# Patient Record
Sex: FEMALE
[De-identification: names, ages, dates, MRNs, and addresses within clinical notes are randomized per-mention and may not be internally consistent; named-entity substitution may affect disease eponyms.]

---

## 2024-06-18 PROBLEM — Z00.00 ENCOUNTER FOR PREVENTIVE HEALTH EXAMINATION: Status: ACTIVE | Noted: 2024-06-18

## 2024-06-24 ENCOUNTER — APPOINTMENT (OUTPATIENT)
Dept: SURGERY | Facility: CLINIC | Age: 54
End: 2024-06-24
Payer: MEDICAID

## 2024-06-24 VITALS
BODY MASS INDEX: 30 KG/M2 | OXYGEN SATURATION: 98 % | HEART RATE: 65 BPM | HEIGHT: 62 IN | WEIGHT: 163 LBS | SYSTOLIC BLOOD PRESSURE: 96 MMHG | DIASTOLIC BLOOD PRESSURE: 68 MMHG

## 2024-06-24 DIAGNOSIS — Z72.0 TOBACCO USE: ICD-10-CM

## 2024-06-24 DIAGNOSIS — K82.8 OTHER SPECIFIED DISEASES OF GALLBLADDER: ICD-10-CM

## 2024-06-24 DIAGNOSIS — K80.20 CALCULUS OF GALLBLADDER W/OUT CHOLECYSTITIS W/OUT OBSTRUCTION: ICD-10-CM

## 2024-06-24 PROCEDURE — 99204 OFFICE O/P NEW MOD 45 MIN: CPT

## 2024-06-24 RX ORDER — LOSARTAN POTASSIUM 100 MG/1
TABLET, FILM COATED ORAL
Refills: 0 | Status: ACTIVE | COMMUNITY

## 2024-06-24 RX ORDER — METOPROLOL TARTRATE 75 MG/1
TABLET, FILM COATED ORAL
Refills: 0 | Status: ACTIVE | COMMUNITY

## 2024-06-24 NOTE — CONSULT LETTER
[Dear  ___] : Dear  [unfilled], [Consult Letter:] : I had the pleasure of evaluating your patient, [unfilled]. [Please see my note below.] : Please see my note below. [Consult Closing:] : Thank you very much for allowing me to participate in the care of this patient.  If you have any questions, please do not hesitate to contact me. [Sincerely,] : Sincerely, [FreeTextEntry3] : Corey Herring MD, FACS

## 2024-06-24 NOTE — PHYSICAL EXAM
[Abdominal Masses] : No abdominal masses [Abdomen Tenderness] : ~T ~M No abdominal tenderness [de-identified] : She  is alert, well-groomed, and in NAD   [de-identified] : anicteric.  Nasal mucosa pink, septum midline. Oral mucosa pink.  Tongue midline, Pharynx without exudates.   [de-identified] : Neck supple. Trachea midline. Thyroid isthmus barely palpable, lobes not felt.

## 2024-06-24 NOTE — PLAN
[FreeTextEntry1] : Ms. MAHNAZ SUTTON  was told significance of findings, options, risks and benefits were explained.  Informed consent for laparoscopic/possible open  cholecystectomy  and potential risks, benefits and alternatives (surgical options were discussed including non-surgical options or the option of no surgery) to the planned surgery were discussed in depth.  All surgical options were discussed including non-surgical treatments.  She wishes to proceed with surgery.  We will plan for surgery on at the next available date, pending any required insurance pre-certification or pre-approval. She agrees to obtain any necessary pre-operative evaluations and testing prior to surgery. Patient instructed to maintain a fat-free diet, and to seek immediate medical attention with any acute change or worsening of symptoms, including but not limited to abdominal pain, fever, chills, nausea, vomiting, or yellowing of the skin.  Patient advised to seek immediate medical attention with any acute change in symptoms or with the development of any new or worsening symptoms.  Patient's questions and concerns addressed to patient's satisfaction, and patient verbalized an understanding of the information discussed.

## 2024-06-24 NOTE — DATA REVIEWED
[FreeTextEntry1] : 	 Exam requested by: NATASHA ABRAMS MD [id number], [place] [facility] [place] [address] SITE PERFORMED: [place] [facility] PHONE: [phone] Patient: MYKEL ALCANTARA YOB: 1970 Phone: [phone] MRN: [id number] Acc: [id number] Date of Exam: 06-   EXAM:  ULTRASOUND ABDOMEN COMPLETE  HISTORY:  Female, 54 years-old with functional dyspepsia  TECHNIQUE:  Using real-time ultrasonography with a high-resolution broadband phased-array curved transducer, multiplanar gray scale images were obtained and supplemented with color Doppler. Static images are provided for review.  COMPARISON:  None  FINDINGS:   Abdominal Aorta/IVC: No evidence of abdominal aortic aneurysm. Visualized portions of the IVC were patent.  Liver:  Normal in size, morphology and contour. The visualized portion of portal and hepatic veins are unremarkable. No intrahepatic biliary duct dilatation. The liver has moderately hyperechoic echotexture.  Gallbladder: The gallbladder is impacted with sludge and small but numerous shadowing gallstones. Acoustic shadowing generated by the gallstones limits sonographically imaging of the distal or posterior gallbladder wall. The remainder of the wall reveals no thickening.  Common Duct: Normal measuring 0.4 cm diameter at the asim hepatis.  Pancreas: The pancreas is obscured by overlying bowel and acoustic shadowing.  No ascites.  Kidneys: Normal in size, morphology and cortical echotexture. There is no suspicious renal lesion. No hydronephrosis, shadowing calculi or perinephric fluid. Right Kidney: 11.3 cm in length. Left Kidney:   11.2 cm in length.  Spleen: Normal size, contour and echotexture measuring 8.3 cm in length.  IMPRESSION: 1. The gallbladder is impacted with sludge and small but numerous shadowing gallstones. Acoustic shadowing generated by the gallstones limits sonographically imaging of the distal or posterior gallbladder wall. The remainder of the wall reveals no thickening. No apparent bile duct ectasia. If however there is any clinical concern for cholecystitis and/or gallbladder functional abnormality, consider HIDA nuclear scan. 2. The liver is moderately hyperechoic, compatible with steatosis, without hepatomegaly. 3. The pancreas is obscured by overlying bowel and acoustic shadowing.     Thank you for the opportunity to participate in the care of this patient.     KANE ABREU MD  - Electronically Signed: 06- 7:31 PM  Physician to Physician Direct Line is: [phone]

## 2024-06-24 NOTE — HISTORY OF PRESENT ILLNESS
[de-identified] : Ms. MYKEL SUTTON is a 54 year  old patient who was referred by Dr. Ivan Quach  with the chief complaint of having right upper quadrant and epigastric pain on and off  for  along time ( ~20 years).  Pain is non-radiating.    She reports no nausea or vomiting and no history of jaundice, acholia or acholuria.   Appetite is good and weight is stable.   She   has  family history of biliary tract disease in her son. She had an abdominal sonogram on  06/01/2024 which revealed GB stone and sludge . CBD is normal

## 2024-07-17 ENCOUNTER — OUTPATIENT (OUTPATIENT)
Dept: OUTPATIENT SERVICES | Facility: HOSPITAL | Age: 54
LOS: 1 days | End: 2024-07-17
Payer: COMMERCIAL

## 2024-07-17 VITALS
WEIGHT: 166.01 LBS | DIASTOLIC BLOOD PRESSURE: 58 MMHG | RESPIRATION RATE: 189 BRPM | SYSTOLIC BLOOD PRESSURE: 96 MMHG | OXYGEN SATURATION: 97 % | HEIGHT: 62.5 IN | HEART RATE: 57 BPM | TEMPERATURE: 97 F

## 2024-07-17 DIAGNOSIS — Z01.818 ENCOUNTER FOR OTHER PREPROCEDURAL EXAMINATION: ICD-10-CM

## 2024-07-17 DIAGNOSIS — Z98.891 HISTORY OF UTERINE SCAR FROM PREVIOUS SURGERY: Chronic | ICD-10-CM

## 2024-07-17 DIAGNOSIS — K81.9 CHOLECYSTITIS, UNSPECIFIED: ICD-10-CM

## 2024-07-17 DIAGNOSIS — I10 ESSENTIAL (PRIMARY) HYPERTENSION: ICD-10-CM

## 2024-07-17 DIAGNOSIS — Z98.51 TUBAL LIGATION STATUS: Chronic | ICD-10-CM

## 2024-07-17 LAB — BLD GP AB SCN SERPL QL: SIGNIFICANT CHANGE UP

## 2024-07-17 RX ORDER — METOPROLOL TARTRATE 50 MG
1 TABLET ORAL
Refills: 0 | DISCHARGE

## 2024-07-17 RX ORDER — LOSARTAN POTASSIUM 100 MG/1
1 TABLET, FILM COATED ORAL
Refills: 0 | DISCHARGE

## 2024-07-17 NOTE — H&P PST ADULT - ASSESSMENT
54 years old female with PMH of HTN presents to [facility] for presurgical evaluation. Patient diagnosed with cholecystitis and is now scheduled for laparoscopic cholecystectomy with intra-op cholangiogram possible open on 7/19/24. Patient optimized for by her PCP.

## 2024-07-17 NOTE — H&P PST ADULT - PROBLEM SELECTOR PLAN 1
Patient scheduled for laparoscopic cholecystectomy with intra-op cholangiogram possible open on 7/19/24. Preop instructions given both verbal and written,  instructed to be NPO the night before and the morning of surgery. Provided with chlorhexidine 4% solution to wash for 3 days including the morning of surgery, bottle provided .Instructed to avoid aspirin and over the counter medications including vitamins and herbal medications one week before surgery. Patient verbalized understanding. Escort required  Stop bang score is 2  , patient low risk for DOC.  T&S collected here today.  Patient optimized for by her PCP.

## 2024-07-17 NOTE — H&P PST ADULT - NSICDXFAMILYHX_GEN_ALL_CORE_FT
FAMILY HISTORY:  Father  Still living? Yes, Estimated age: Age Unknown  FH: HTN (hypertension), Age at diagnosis: Age Unknown    Mother  Still living? Unknown  FH: HTN (hypertension), Age at diagnosis: Age Unknown

## 2024-07-17 NOTE — H&P PST ADULT - HISTORY OF PRESENT ILLNESS
54 years old female with PMH 54 years old female with PMH of HTN presents to [facility] for presurgical evaluation. Patient diagnosed with cholecystitis and is now scheduled for laparoscopic cholecystectomy with intra-op cholangiogram possible open on 7/19/24. Patient optimized for by her PCP.

## 2024-07-17 NOTE — H&P PST ADULT - PROBLEM SELECTOR PLAN 2
Patient instructed to continue antihypertensive medications as prescribed and take the morning of surgery with just a sips of water.

## 2024-07-18 PROCEDURE — 86900 BLOOD TYPING SEROLOGIC ABO: CPT

## 2024-07-18 PROCEDURE — 86850 RBC ANTIBODY SCREEN: CPT

## 2024-07-18 PROCEDURE — T1013: CPT

## 2024-07-18 PROCEDURE — 36415 COLL VENOUS BLD VENIPUNCTURE: CPT

## 2024-07-18 PROCEDURE — G0463: CPT

## 2024-07-18 PROCEDURE — 86901 BLOOD TYPING SEROLOGIC RH(D): CPT

## 2024-08-06 PROBLEM — I10 ESSENTIAL (PRIMARY) HYPERTENSION: Chronic | Status: ACTIVE | Noted: 2024-07-17

## 2024-08-07 ENCOUNTER — APPOINTMENT (OUTPATIENT)
Dept: SURGERY | Facility: HOSPITAL | Age: 54
End: 2024-08-07

## 2024-08-16 ENCOUNTER — APPOINTMENT (OUTPATIENT)
Dept: SURGERY | Facility: HOSPITAL | Age: 54
End: 2024-08-16

## 2024-08-16 ENCOUNTER — RESULT REVIEW (OUTPATIENT)
Age: 54
End: 2024-08-16

## 2024-08-16 ENCOUNTER — TRANSCRIPTION ENCOUNTER (OUTPATIENT)
Age: 54
End: 2024-08-16

## 2024-08-21 ENCOUNTER — APPOINTMENT (OUTPATIENT)
Dept: GASTROENTEROLOGY | Facility: CLINIC | Age: 54
End: 2024-08-21
Payer: COMMERCIAL

## 2024-08-21 DIAGNOSIS — K80.50 CALCULUS OF BILE DUCT W/OUT CHOLANGITIS OR CHOLECYSTITIS W/OUT OBSTRUCTION: ICD-10-CM

## 2024-08-21 PROCEDURE — 99203 OFFICE O/P NEW LOW 30 MIN: CPT

## 2024-08-21 NOTE — ASSESSMENT
[FreeTextEntry1] : 54F with pmhx of HTN, cholelithiasis s/p cholecystectomy presenting for evaluation for choledocholithiasis. Had recent cholecystectomy, IOC showed distal cbd stone. Denies any complaints currently. - IOC reviewed, schedule ERCP for stone removal asap.  Total time spent to complete patient's assessment, review medical chart including labs & personal review of prior imaging and available endoscopy records, counseling and discussion of plan of care was more than 30 minutes.

## 2024-08-21 NOTE — HISTORY OF PRESENT ILLNESS
[FreeTextEntry1] : 54F with pmhx of HTN, cholelithiasis s/p cholecystectomy presenting for evaluation for choledocholithiasis. Had recent cholecystectomy, IOC showed distal cbd stone. Denies any complaints currently, no abd pain, n/v/d/c, melena, hematochezia, fever/chills, jaundice, dark urine, or other issues. Feels well.

## 2024-09-05 ENCOUNTER — APPOINTMENT (OUTPATIENT)
Dept: GASTROENTEROLOGY | Facility: HOSPITAL | Age: 54
End: 2024-09-05

## 2024-09-05 ENCOUNTER — OUTPATIENT (OUTPATIENT)
Dept: OUTPATIENT SERVICES | Facility: HOSPITAL | Age: 54
LOS: 1 days | Discharge: HOME | End: 2024-09-05
Payer: MEDICAID

## 2024-09-05 ENCOUNTER — TRANSCRIPTION ENCOUNTER (OUTPATIENT)
Age: 54
End: 2024-09-05

## 2024-09-05 ENCOUNTER — RESULT REVIEW (OUTPATIENT)
Age: 54
End: 2024-09-05

## 2024-09-05 VITALS
WEIGHT: 164.91 LBS | DIASTOLIC BLOOD PRESSURE: 68 MMHG | SYSTOLIC BLOOD PRESSURE: 117 MMHG | HEART RATE: 52 BPM | HEIGHT: 62 IN | RESPIRATION RATE: 12 BRPM | TEMPERATURE: 97 F | OXYGEN SATURATION: 100 %

## 2024-09-05 VITALS
RESPIRATION RATE: 16 BRPM | OXYGEN SATURATION: 100 % | DIASTOLIC BLOOD PRESSURE: 62 MMHG | HEART RATE: 55 BPM | SYSTOLIC BLOOD PRESSURE: 145 MMHG

## 2024-09-05 DIAGNOSIS — Z98.51 TUBAL LIGATION STATUS: Chronic | ICD-10-CM

## 2024-09-05 DIAGNOSIS — K80.50 CALCULUS OF BILE DUCT WITHOUT CHOLANGITIS OR CHOLECYSTITIS WITHOUT OBSTRUCTION: ICD-10-CM

## 2024-09-05 DIAGNOSIS — Z98.891 HISTORY OF UTERINE SCAR FROM PREVIOUS SURGERY: Chronic | ICD-10-CM

## 2024-09-05 PROCEDURE — 43239 EGD BIOPSY SINGLE/MULTIPLE: CPT

## 2024-09-05 PROCEDURE — 43274 ERCP DUCT STENT PLACEMENT: CPT

## 2024-09-05 PROCEDURE — 43264 ERCP REMOVE DUCT CALCULI: CPT

## 2024-09-05 PROCEDURE — 88305 TISSUE EXAM BY PATHOLOGIST: CPT | Mod: 26

## 2024-09-05 DEVICE — CATH BLLN EXTRACT DIST GUIDE WIRE 15MM 3LUM: Type: IMPLANTABLE DEVICE | Status: FUNCTIONAL

## 2024-09-05 DEVICE — CLIP HEMOSTASIS DURACLIP 11MM: Type: IMPLANTABLE DEVICE | Status: FUNCTIONAL

## 2024-09-05 DEVICE — HYDRATOME 44: Type: IMPLANTABLE DEVICE | Status: FUNCTIONAL

## 2024-09-05 DEVICE — STENT BIL ADVANIX 10FRX5CM PRELOADED: Type: IMPLANTABLE DEVICE | Status: FUNCTIONAL

## 2024-09-05 RX ORDER — ACETAMINOPHEN 325 MG/1
1000 TABLET ORAL ONCE
Refills: 0 | Status: COMPLETED | OUTPATIENT
Start: 2024-09-05 | End: 2024-09-05

## 2024-09-05 RX ORDER — INDOMETHACIN 75 MG
100 CAPSULE, EXTENDED RELEASE ORAL ONCE
Refills: 0 | Status: ACTIVE | OUTPATIENT
Start: 2024-09-05 | End: 2024-09-05

## 2024-09-05 RX ADMIN — Medication 30 MILLILITER(S): at 08:26

## 2024-09-05 RX ADMIN — ACETAMINOPHEN 400 MILLIGRAM(S): 325 TABLET ORAL at 10:32

## 2024-09-05 NOTE — ASU DISCHARGE PLAN (ADULT/PEDIATRIC) - NS MD DC FALL RISK RISK
For information on Fall & Injury Prevention, visit: [email or web address] OR  [email or web address] OR  https://www.cdc.gov/steadi/patient.html

## 2024-09-09 LAB — SURGICAL PATHOLOGY STUDY: SIGNIFICANT CHANGE UP

## 2024-10-30 ENCOUNTER — APPOINTMENT (OUTPATIENT)
Dept: GASTROENTEROLOGY | Facility: CLINIC | Age: 54
End: 2024-10-30
Payer: COMMERCIAL

## 2024-10-30 DIAGNOSIS — A04.8 OTHER SPECIFIED BACTERIAL INTESTINAL INFECTIONS: ICD-10-CM

## 2024-10-30 DIAGNOSIS — K80.50 CALCULUS OF BILE DUCT W/OUT CHOLANGITIS OR CHOLECYSTITIS W/OUT OBSTRUCTION: ICD-10-CM

## 2024-10-30 PROCEDURE — 99443: CPT

## 2024-10-30 RX ORDER — METRONIDAZOLE 500 MG/1
500 TABLET ORAL 3 TIMES DAILY
Qty: 42 | Refills: 0 | Status: ACTIVE | COMMUNITY
Start: 2024-10-30 | End: 1900-01-01

## 2024-10-30 RX ORDER — OMEPRAZOLE 40 MG/1
40 CAPSULE, DELAYED RELEASE ORAL TWICE DAILY
Qty: 28 | Refills: 0 | Status: ACTIVE | COMMUNITY
Start: 2024-10-30 | End: 1900-01-01

## 2024-10-30 RX ORDER — DOXYCYCLINE HYCLATE 100 MG/1
100 TABLET ORAL
Qty: 28 | Refills: 0 | Status: ACTIVE | COMMUNITY
Start: 2024-10-30 | End: 1900-01-01

## 2024-11-21 NOTE — ASU PATIENT PROFILE, ADULT - NSICDXPASTSURGICALHX_GEN_ALL_CORE_FT
Patient brought in by EMS c/o being out of medication. She states that the medication is for her shortness of breath.  Per Medic patient is currently at 99% on room air and oxygen was only applied per patient request. PAST SURGICAL HISTORY:  H/O tubal ligation     H/O:  section

## 2024-11-22 ENCOUNTER — RESULT REVIEW (OUTPATIENT)
Age: 54
End: 2024-11-22

## 2024-11-22 ENCOUNTER — APPOINTMENT (OUTPATIENT)
Dept: GASTROENTEROLOGY | Facility: HOSPITAL | Age: 54
End: 2024-11-22

## 2024-11-22 ENCOUNTER — OUTPATIENT (OUTPATIENT)
Dept: OUTPATIENT SERVICES | Facility: HOSPITAL | Age: 54
LOS: 1 days | Discharge: HOME | End: 2024-11-22
Payer: MEDICAID

## 2024-11-22 ENCOUNTER — TRANSCRIPTION ENCOUNTER (OUTPATIENT)
Age: 54
End: 2024-11-22

## 2024-11-22 VITALS
OXYGEN SATURATION: 98 % | RESPIRATION RATE: 14 BRPM | SYSTOLIC BLOOD PRESSURE: 158 MMHG | HEART RATE: 50 BPM | DIASTOLIC BLOOD PRESSURE: 90 MMHG

## 2024-11-22 VITALS
SYSTOLIC BLOOD PRESSURE: 109 MMHG | TEMPERATURE: 98 F | DIASTOLIC BLOOD PRESSURE: 69 MMHG | OXYGEN SATURATION: 100 % | HEART RATE: 59 BPM | RESPIRATION RATE: 17 BRPM

## 2024-11-22 DIAGNOSIS — Z98.51 TUBAL LIGATION STATUS: Chronic | ICD-10-CM

## 2024-11-22 DIAGNOSIS — A04.8 OTHER SPECIFIED BACTERIAL INTESTINAL INFECTIONS: ICD-10-CM

## 2024-11-22 DIAGNOSIS — Z98.891 HISTORY OF UTERINE SCAR FROM PREVIOUS SURGERY: Chronic | ICD-10-CM

## 2024-11-22 PROCEDURE — 43264 ERCP REMOVE DUCT CALCULI: CPT

## 2024-11-22 PROCEDURE — 43275 ERCP REMOVE FORGN BODY DUCT: CPT

## 2024-11-22 PROCEDURE — 43239 EGD BIOPSY SINGLE/MULTIPLE: CPT

## 2024-11-22 PROCEDURE — 88305 TISSUE EXAM BY PATHOLOGIST: CPT | Mod: 26

## 2024-11-22 DEVICE — HYDRATOME 44: Type: IMPLANTABLE DEVICE | Status: FUNCTIONAL

## 2024-11-22 DEVICE — CATH BLLN EXTRACT DIST GUIDE WIRE 15MM 3LUM: Type: IMPLANTABLE DEVICE | Status: FUNCTIONAL

## 2024-11-22 RX ORDER — ONDANSETRON HYDROCHLORIDE 4 MG/1
4 TABLET, FILM COATED ORAL ONCE
Refills: 0 | Status: COMPLETED | OUTPATIENT
Start: 2024-11-22 | End: 2024-11-22

## 2024-11-22 RX ORDER — ACETAMINOPHEN 500MG 500 MG/1
1000 TABLET, COATED ORAL ONCE
Refills: 0 | Status: COMPLETED | OUTPATIENT
Start: 2024-11-22 | End: 2024-11-22

## 2024-11-22 RX ADMIN — ACETAMINOPHEN 500MG 400 MILLIGRAM(S): 500 TABLET, COATED ORAL at 14:27

## 2024-11-22 RX ADMIN — ONDANSETRON HYDROCHLORIDE 4 MILLIGRAM(S): 4 TABLET, FILM COATED ORAL at 14:27

## 2024-11-22 NOTE — PRE PROCEDURE NOTE - PRE PROCEDURE EVALUATION
HPI:    Here for ERCP.    PAST MEDICAL & SURGICAL HISTORY:  HTN (hypertension)      H/O:  section      H/O tubal ligation        See chart.    MEDICATIONS:    See chart.     ALLERGIES:  No Known Allergies    See chart.     SOCIAL HISTORY:     Denies toxic habits.     FAMILY HISTORY:  FH: HTN (hypertension) (Father, Mother)      Noncontributory.     REVIEW OF SYSTEMS:  CONSTITUTIONAL: No weakness, fevers or chills.  EYES/ENT: No visual changes;  No vertigo or throat pain.  NECK: No pain or stiffness.  RESPIRATORY: No cough, wheezing, hemoptysis; No shortness of breath.  CARDIOVASCULAR: No chest pain or palpitations.  GASTROINTESTINAL: As per HPI.   GENITOURINARY: No dysuria, frequency or hematuria.  NEUROLOGICAL: No numbness or weakness.  SKIN: No itching, rashes.      PHYSICAL EXAM:  VITAL SIGNS:  T(C): --  HR: --  BP: --  RR: --  SpO2: --  I/Os:     See chart.     GENERAL: TABITHA, non toxic, comfortable in bed  HEENT: EOMI, no icterus, no tracheal deviation, moist mucus membranes   CARDIO: Regular rate and rhythm, no murmurs, rubs or gallops  LUNGS: No wheezing, rales or rhonchi  ABDOMEN: Soft, non tender, non distended, no rebound or guarding  VASCULAR: Warm and well perfused without peripheral edema and palpable pulses  PSYCH AAO x 3, normal mood and affect   SKIN: warm, dry, intact     LABS:                 RADIOLOGY & ADDITIONAL TESTS: Reviewed.       Risks, benefits, and alternatives of the procedure discussed at length including but not limited to bleeding, perforation, pancreaittis, anesthesia related complication, infection, etc. Patient expressed understanding and agreeable for procedure. Patient stable for planned procedure.

## 2024-11-22 NOTE — ASU DISCHARGE PLAN (ADULT/PEDIATRIC) - FINANCIAL ASSISTANCE
[facility] provides services at a reduced cost to those who are determined to be eligible through [facility]’s financial assistance program. Information regarding [facility]’s financial assistance program can be found by going to [email or web address] or by calling [phone].

## 2024-11-27 LAB — SURGICAL PATHOLOGY STUDY: SIGNIFICANT CHANGE UP

## 2024-12-10 RX ORDER — BISMUTH SUBSALICYLATE 262 MG/1
262 TABLET, CHEWABLE ORAL 4 TIMES DAILY
Qty: 112 | Refills: 0 | Status: ACTIVE | COMMUNITY
Start: 2024-12-10 | End: 1900-01-01

## 2024-12-10 RX ORDER — CLARITHROMYCIN 500 MG/1
500 TABLET, FILM COATED ORAL TWICE DAILY
Qty: 28 | Refills: 0 | Status: ACTIVE | COMMUNITY
Start: 2024-12-10 | End: 1900-01-01

## 2024-12-10 RX ORDER — LEVOFLOXACIN 500 MG/1
500 TABLET, FILM COATED ORAL DAILY
Qty: 14 | Refills: 0 | Status: ACTIVE | COMMUNITY
Start: 2024-12-10 | End: 1900-01-01
